# Patient Record
Sex: FEMALE
[De-identification: names, ages, dates, MRNs, and addresses within clinical notes are randomized per-mention and may not be internally consistent; named-entity substitution may affect disease eponyms.]

---

## 2022-11-23 ENCOUNTER — DASHBOARD ENCOUNTERS (OUTPATIENT)
Age: 67
End: 2022-11-23

## 2022-11-29 ENCOUNTER — OFFICE VISIT (OUTPATIENT)
Dept: URBAN - METROPOLITAN AREA CLINIC 128 | Facility: CLINIC | Age: 67
End: 2022-11-29
Payer: COMMERCIAL

## 2022-11-29 ENCOUNTER — LAB OUTSIDE AN ENCOUNTER (OUTPATIENT)
Dept: URBAN - METROPOLITAN AREA CLINIC 128 | Facility: CLINIC | Age: 67
End: 2022-11-29

## 2022-11-29 ENCOUNTER — WEB ENCOUNTER (OUTPATIENT)
Dept: URBAN - METROPOLITAN AREA CLINIC 128 | Facility: CLINIC | Age: 67
End: 2022-11-29

## 2022-11-29 VITALS
SYSTOLIC BLOOD PRESSURE: 134 MMHG | DIASTOLIC BLOOD PRESSURE: 88 MMHG | WEIGHT: 118 LBS | HEIGHT: 62 IN | HEART RATE: 70 BPM | TEMPERATURE: 97.8 F | BODY MASS INDEX: 21.71 KG/M2

## 2022-11-29 DIAGNOSIS — K64.1 GRADE II HEMORRHOIDS: ICD-10-CM

## 2022-11-29 DIAGNOSIS — Z12.11 SCREENING FOR MALIGNANT NEOPLASM OF COLON: ICD-10-CM

## 2022-11-29 PROCEDURE — 99202 OFFICE O/P NEW SF 15 MIN: CPT | Performed by: STUDENT IN AN ORGANIZED HEALTH CARE EDUCATION/TRAINING PROGRAM

## 2022-11-29 RX ORDER — VALACYCLOVIR 500 MG/1
1 TABLET TABLET, FILM COATED ORAL ONCE A DAY
Status: ACTIVE | COMMUNITY

## 2022-11-29 RX ORDER — AMLODIPINE BESYLATE AND BENAZEPRIL HYDROCHLORIDE 5; 10 MG/1; MG/1
AS DIRECTED CAPSULE ORAL
Status: ACTIVE | COMMUNITY

## 2022-11-29 RX ORDER — ROSUVASTATIN CALCIUM 10 MG/1
1 TABLET TABLET, FILM COATED ORAL ONCE A DAY
Status: ACTIVE | COMMUNITY

## 2022-11-29 NOTE — HPI-TODAY'S VISIT:
Pt is a 66 yo [place] F who presents for evaluation for colon cancer screeing.  Symptoms:  Pt denies abdominal pain, change in stool habits, change in stool caliber, unintentional weight loss, hematochezia or other new symptoms in the last 6-12 months.  She has hemorrhoids with spotting noted on wet wipes.  She went to see Dr. Snell and used a suppository.  Has had banding in [place] in the past.    OAC/APT use:  Denies exposure to NSAIDs and not on any OAC/APT.  Substance use history:  Lifetime non-smoker,  Social use of alcohol.  Uses  multivitamin, glucosamine-chondroitn, calcium.    FH:  Maternal aunt diagnosed with colon cancer   >= 60 years of age at time of diagnosis.    Abdominal surgeries:   Last C-scope: 2017 in [place], with normal results.

## 2022-11-30 PROBLEM — 305058001: Status: ACTIVE | Noted: 2022-11-29

## 2022-11-30 PROBLEM — 721704005: Status: ACTIVE | Noted: 2022-11-29

## 2022-12-20 ENCOUNTER — WEB ENCOUNTER (OUTPATIENT)
Dept: URBAN - METROPOLITAN AREA SURGERY CENTER 31 | Facility: SURGERY CENTER | Age: 67
End: 2022-12-20

## 2022-12-23 ENCOUNTER — OFFICE VISIT (OUTPATIENT)
Dept: URBAN - METROPOLITAN AREA SURGERY CENTER 31 | Facility: SURGERY CENTER | Age: 67
End: 2022-12-23

## 2022-12-23 ENCOUNTER — CLAIMS CREATED FROM THE CLAIM WINDOW (OUTPATIENT)
Dept: URBAN - METROPOLITAN AREA SURGERY CENTER 31 | Facility: SURGERY CENTER | Age: 67
End: 2022-12-23
Payer: COMMERCIAL

## 2022-12-23 DIAGNOSIS — Z80.0 BROTHER AT YOUNG AGE FAMILY HISTORY OF COLON CANCER: ICD-10-CM

## 2022-12-23 DIAGNOSIS — K63.89 BACTERIAL OVERGROWTH SYNDROME: ICD-10-CM

## 2022-12-23 PROCEDURE — G8907 PT DOC NO EVENTS ON DISCHARG: HCPCS | Performed by: STUDENT IN AN ORGANIZED HEALTH CARE EDUCATION/TRAINING PROGRAM

## 2022-12-23 PROCEDURE — 45380 COLONOSCOPY AND BIOPSY: CPT | Performed by: STUDENT IN AN ORGANIZED HEALTH CARE EDUCATION/TRAINING PROGRAM

## 2022-12-23 RX ORDER — VALACYCLOVIR 500 MG/1
1 TABLET TABLET, FILM COATED ORAL ONCE A DAY
Status: ACTIVE | COMMUNITY

## 2022-12-23 RX ORDER — AMLODIPINE BESYLATE AND BENAZEPRIL HYDROCHLORIDE 5; 10 MG/1; MG/1
AS DIRECTED CAPSULE ORAL
Status: ACTIVE | COMMUNITY

## 2022-12-23 RX ORDER — ROSUVASTATIN CALCIUM 10 MG/1
1 TABLET TABLET, FILM COATED ORAL ONCE A DAY
Status: ACTIVE | COMMUNITY

## 2022-12-28 ENCOUNTER — TELEPHONE ENCOUNTER (OUTPATIENT)
Dept: URBAN - METROPOLITAN AREA CLINIC 19 | Facility: CLINIC | Age: 67
End: 2022-12-28

## 2022-12-29 ENCOUNTER — LAB OUTSIDE AN ENCOUNTER (OUTPATIENT)
Dept: URBAN - METROPOLITAN AREA CLINIC 19 | Facility: CLINIC | Age: 67
End: 2022-12-29

## 2022-12-29 ENCOUNTER — OFFICE VISIT (OUTPATIENT)
Dept: URBAN - METROPOLITAN AREA CLINIC 19 | Facility: CLINIC | Age: 67
End: 2022-12-29

## 2022-12-29 RX ORDER — AMLODIPINE BESYLATE AND BENAZEPRIL HYDROCHLORIDE 5; 10 MG/1; MG/1
AS DIRECTED CAPSULE ORAL
Status: ACTIVE | COMMUNITY

## 2022-12-29 RX ORDER — ROSUVASTATIN CALCIUM 10 MG/1
1 TABLET TABLET, FILM COATED ORAL ONCE A DAY
Status: ACTIVE | COMMUNITY

## 2022-12-29 RX ORDER — VALACYCLOVIR 500 MG/1
1 TABLET TABLET, FILM COATED ORAL ONCE A DAY
Status: ACTIVE | COMMUNITY

## 2022-12-29 NOTE — EXAM-FUNCTIONAL ASSESSMENT
Rectal: normal sphincter tone and rectal muscle upon bearing down. No obvious hemorrhoids, rectal masses, or bleeding present

## 2022-12-29 NOTE — HPI-TODAY'S VISIT:
2022:  Gunner: The patient is a 66 yo F with a h/o hemorrhoids, and prior hemorrhoidal banding in [place] who presents after her colonoscopy for hemorrhoid banding. C-scope on 2022 weith findings of a 2 ,mm polyp and hemorrhoids.  2022:  Gunner: Pt is a 66 yo [place] F who presents for evaluation for colon cancer screeing.  Symptoms:  Pt denies abdominal pain, change in stool habits, change in stool caliber, unintentional weight loss, hematochezia or other new symptoms in the last 6-12 months.  She has hemorrhoids with spotting noted on wet wipes.  She went to see Dr. Snell and used a suppository.  Has had banding in [place] in the past.    OAC/APT use:  Denies exposure to NSAIDs and not on any OAC/APT.  Substance use history:  Lifetime non-smoker,  Social use of alcohol.  Uses  multivitamin, glucosamine-chondroitn, calcium.    FH:  Maternal aunt diagnosed with colon cancer   >= 60 years of age at time of diagnosis.    Abdominal surgeries:   Last C-scope: 2017 in [place], with normal results.